# Patient Record
Sex: MALE | URBAN - METROPOLITAN AREA
[De-identification: names, ages, dates, MRNs, and addresses within clinical notes are randomized per-mention and may not be internally consistent; named-entity substitution may affect disease eponyms.]

---

## 2021-08-04 ENCOUNTER — OFFICE VISIT (OUTPATIENT)
Dept: URBAN - METROPOLITAN AREA CLINIC 34 | Facility: CLINIC | Age: 36
End: 2021-08-04
Payer: COMMERCIAL

## 2021-08-04 DIAGNOSIS — S02.85XS FRACTURE OF ORBIT, UNSPECIFIED, SEQUELA: Primary | ICD-10-CM

## 2021-08-04 PROCEDURE — 92004 COMPRE OPH EXAM NEW PT 1/>: CPT | Performed by: OPHTHALMOLOGY

## 2021-08-04 PROCEDURE — 92285 EXTERNAL OCULAR PHOTOGRAPHY: CPT | Performed by: OPHTHALMOLOGY

## 2021-08-04 NOTE — IMPRESSION/PLAN
Impression: Fracture of orbit, unspecified, sequela: S02.85XS. Plan: Right inferior orbital wall 7/24/21; CT scan reviewed. Patient has small right orbital floor fracture with inferior rectus muscle distortion but low likelihood of entrapment. Patient reports that he has had some vertical diplopia in peripheral gaze but it is not present in primary gaze with positional compensation. Patient reports that double vision is slowly improving over the past week. On exam, his extraocular movements are full OU with no signs of restriction OD. Diplopia worse in up and down gaze. Jessica equal OU. No long with sub-conj air. 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 Treatment options reviewed with patient including observation vs. surgery with dissolvable implant to span floor fracture. PAtient prefers to hold off on surgery for now. Will get repeat CT MAx face to evaluate inferior rectus. Explained that his double vision may continue to improve but the longer we wait to do any orbital surgery, the more scarring will be in place, thus making floor fracture repair/release of inferior rectus from scar tissue more difficult. PAtient voiced understanding. Will reassess in 1 week [address] scan. 
Will refer patient to optometry for dialted fundus exam.

## 2021-08-17 ENCOUNTER — OFFICE VISIT (OUTPATIENT)
Dept: URBAN - METROPOLITAN AREA CLINIC 34 | Facility: CLINIC | Age: 36
End: 2021-08-17
Payer: COMMERCIAL

## 2021-08-17 PROCEDURE — 92012 INTRM OPH EXAM EST PATIENT: CPT | Performed by: OPHTHALMOLOGY

## 2021-08-17 NOTE — IMPRESSION/PLAN
Impression: Fracture of orbit, unspecified, sequela: S02.85XS. Plan: Right inferior orbital wall 7/24/21; CT scan reviewed. Patient has small right orbital floor fracture with inferior rectus muscle distortion but low likelihood of entrapment. Patient reports that he has had some vertical diplopia in peripheral gaze but it is not present in primary gaze with positional compensation. Patient reports that double vision is slowly improving since the last exam. 
On exam, his extraocular movements are full OU with no signs of restriction OD. Sean equal OU. No long with sub-conj air. Treatment- will hold off for surgery as pt. reports improvement. Will refer patient to optometry for dilated fundus exam in 1 week. RTC in 3 months to reassess the double vision.